# Patient Record
Sex: FEMALE | Race: WHITE | NOT HISPANIC OR LATINO | URBAN - METROPOLITAN AREA
[De-identification: names, ages, dates, MRNs, and addresses within clinical notes are randomized per-mention and may not be internally consistent; named-entity substitution may affect disease eponyms.]

---

## 2020-09-28 ENCOUNTER — OFFICE VISIT (OUTPATIENT)
Dept: URBAN - METROPOLITAN AREA CLINIC 27 | Facility: CLINIC | Age: 60
End: 2020-09-28

## 2020-11-19 ENCOUNTER — OFFICE VISIT (OUTPATIENT)
Dept: URBAN - METROPOLITAN AREA CLINIC 27 | Facility: CLINIC | Age: 60
End: 2020-11-19

## 2021-01-13 ENCOUNTER — OFFICE VISIT (OUTPATIENT)
Dept: URBAN - METROPOLITAN AREA CLINIC 27 | Facility: CLINIC | Age: 61
End: 2021-01-13

## 2021-01-13 PROBLEM — 40739000 DYSPHAGIA: Status: ACTIVE | Noted: 2021-01-13

## 2021-01-13 PROBLEM — 266435005 GASTRO-ESOPHAGEAL REFLUX DISEASE WITHOUT ESOPHAGITIS: Status: ACTIVE | Noted: 2021-01-13

## 2021-01-13 PROBLEM — 363367000: Status: ACTIVE | Noted: 2021-01-13

## 2021-01-13 PROBLEM — 110483000 TOBACCO USER: Status: ACTIVE | Noted: 2021-01-13

## 2021-01-13 PROBLEM — 275978004 SCREENING FOR MALIGNANT NEOPLASM OF COLON: Status: ACTIVE | Noted: 2021-01-13

## 2021-01-22 ENCOUNTER — OFFICE VISIT (OUTPATIENT)
Dept: URBAN - METROPOLITAN AREA SURGERY CENTER 7 | Facility: SURGERY CENTER | Age: 61
End: 2021-01-22

## 2022-04-30 ENCOUNTER — TELEPHONE ENCOUNTER (OUTPATIENT)
Dept: URBAN - METROPOLITAN AREA CLINIC 121 | Facility: CLINIC | Age: 62
End: 2022-04-30

## 2022-04-30 RX ORDER — SUCRALFATE 1 G/10ML
TAKE 10ML BY MOUTH 3 TIMES DAILY SUSPENSION ORAL
OUTPATIENT
Start: 2012-10-25 | End: 2021-01-13

## 2022-04-30 RX ORDER — RABEPRAZOLE SODIUM 20 MG/1
TAKE 1 TABLET BY MOUTH DAILY TABLET, DELAYED RELEASE ORAL
OUTPATIENT
Start: 2012-10-25 | End: 2021-01-13

## 2022-04-30 RX ORDER — RABEPRAZOLE SODIUM 20 MG/1
TAKE 1 TABLET BY MOUTH DAILY TABLET, DELAYED RELEASE ORAL
OUTPATIENT
Start: 2012-10-25

## 2022-04-30 RX ORDER — TAMOXIFEN CITRATE 20 MG/1
TABLET, FILM COATED ORAL
OUTPATIENT
Start: 2006-06-05 | End: 2012-10-18

## 2022-04-30 RX ORDER — ESOMEPRAZOLE MAGNESIUM 40 MG
TAKE 1 CAPSULE BY MOUTH DAILY CAPSULE,DELAYED RELEASE (ENTERIC COATED) ORAL
OUTPATIENT
Start: 2012-10-25

## 2022-04-30 RX ORDER — RABEPRAZOLE SODIUM 20 MG/1
QD TABLET, DELAYED RELEASE ORAL
OUTPATIENT
Start: 2012-10-18 | End: 2021-01-13

## 2022-04-30 RX ORDER — OXYCODONE HYDROCHLORIDE 10 MG/1
TABLET ORAL
OUTPATIENT
Start: 2010-06-16

## 2022-04-30 RX ORDER — ESOMEPRAZOLE MAGNESIUM 40 MG
TAKE 1 CAPSULE BY MOUTH DAILY CAPSULE,DELAYED RELEASE (ENTERIC COATED) ORAL
OUTPATIENT
Start: 2012-10-25 | End: 2021-01-13

## 2022-04-30 RX ORDER — RABEPRAZOLE SODIUM 20 MG/1
QD TABLET, DELAYED RELEASE ORAL
OUTPATIENT
Start: 2012-10-18

## 2022-04-30 RX ORDER — TAMOXIFEN CITRATE 20 MG/1
TABLET, FILM COATED ORAL
OUTPATIENT
Start: 2006-06-05

## 2022-04-30 RX ORDER — SUCRALFATE 1 G/10ML
TAKE 10ML BY MOUTH 3 TIMES DAILY SUSPENSION ORAL
OUTPATIENT
Start: 2012-10-25

## 2022-05-01 ENCOUNTER — TELEPHONE ENCOUNTER (OUTPATIENT)
Dept: URBAN - METROPOLITAN AREA CLINIC 121 | Facility: CLINIC | Age: 62
End: 2022-05-01

## 2022-05-01 RX ORDER — OMEPRAZOLE 40 MG/1
1 CAPSULE PO BID 30 MINS BEFORE MEALS CAPSULE, DELAYED RELEASE ORAL
Status: ACTIVE | COMMUNITY
Start: 2021-01-13

## 2022-05-01 RX ORDER — FAMOTIDINE 20 MG
TABLET ORAL
Status: ACTIVE | COMMUNITY
Start: 2021-01-13

## 2022-05-01 RX ORDER — OXYCODONE HYDROCHLORIDE 10 MG/1
PRN TABLET ORAL
Status: ACTIVE | COMMUNITY
Start: 2012-10-18

## 2022-05-01 RX ORDER — CYCLOBENZAPRINE HYDROCHLORIDE 10 MG/1
TABLET, FILM COATED ORAL
Status: ACTIVE | COMMUNITY
Start: 2021-01-13

## 2022-05-01 RX ORDER — DICYCLOMINE HYDROCHLORIDE 10 MG/1
TAKE 1-2 CAPSULE PO Q6H PRN CAPSULE ORAL
Status: ACTIVE | COMMUNITY
Start: 2021-01-13

## 2025-02-03 ENCOUNTER — LAB OUTSIDE AN ENCOUNTER (OUTPATIENT)
Dept: URBAN - METROPOLITAN AREA CLINIC 27 | Facility: CLINIC | Age: 65
End: 2025-02-03

## 2025-02-03 ENCOUNTER — DASHBOARD ENCOUNTERS (OUTPATIENT)
Age: 65
End: 2025-02-03

## 2025-02-03 ENCOUNTER — TELEPHONE ENCOUNTER (OUTPATIENT)
Dept: URBAN - METROPOLITAN AREA CLINIC 27 | Facility: CLINIC | Age: 65
End: 2025-02-03

## 2025-02-03 ENCOUNTER — OFFICE VISIT (OUTPATIENT)
Dept: URBAN - METROPOLITAN AREA CLINIC 27 | Facility: CLINIC | Age: 65
End: 2025-02-03
Payer: MEDICARE

## 2025-02-03 VITALS
HEART RATE: 65 BPM | BODY MASS INDEX: 20.14 KG/M2 | DIASTOLIC BLOOD PRESSURE: 82 MMHG | HEIGHT: 64 IN | SYSTOLIC BLOOD PRESSURE: 141 MMHG | WEIGHT: 118 LBS

## 2025-02-03 DIAGNOSIS — K21.9 CHRONIC GERD: ICD-10-CM

## 2025-02-03 PROBLEM — 235595009: Status: ACTIVE | Noted: 2025-02-03

## 2025-02-03 PROCEDURE — 99214 OFFICE O/P EST MOD 30 MIN: CPT | Performed by: INTERNAL MEDICINE

## 2025-02-03 RX ORDER — PANTOPRAZOLE SODIUM 40 MG/1
1 TABLET 1/2 TO 1 HOUR BEFORE MORNING MEAL TABLET, DELAYED RELEASE ORAL ONCE A DAY
Status: ACTIVE | COMMUNITY

## 2025-02-03 RX ORDER — FAMOTIDINE 20 MG/1
TABLET, FILM COATED ORAL
Status: ACTIVE | COMMUNITY
Start: 2021-01-13

## 2025-02-03 RX ORDER — OMEPRAZOLE 40 MG/1
1 CAPSULE PO BID 30 MINS BEFORE MEALS CAPSULE, DELAYED RELEASE ORAL
Status: ACTIVE | COMMUNITY
Start: 2021-01-13

## 2025-02-03 RX ORDER — OXYCODONE HYDROCHLORIDE 10 MG/1
PRN TABLET ORAL
Status: ACTIVE | COMMUNITY
Start: 2012-10-18

## 2025-02-03 RX ORDER — DICYCLOMINE HYDROCHLORIDE 10 MG/1
TAKE 1-2 CAPSULE PO Q6H PRN CAPSULE ORAL
Status: ACTIVE | COMMUNITY
Start: 2021-01-13

## 2025-02-03 RX ORDER — CYCLOBENZAPRINE HYDROCHLORIDE 10 MG/1
TABLET, FILM COATED ORAL
Status: ACTIVE | COMMUNITY
Start: 2021-01-13

## 2025-02-03 NOTE — HPI-TODAY'S VISIT:
64-year-old female here for abdominal pain, acid reflux.  She has been having issues with a sore throat for years.  She has been seen by ENT and was told it was likely GERD.  She is currently on pantoprazole twice daily and Pepcid, but continues to have sore throat essentially all the time now.  She has tried omeprazole in the past without much improvement.  She is also on a nasal steroid which has helped with her runny nose, but not her sore throat.

## 2025-02-07 ENCOUNTER — TELEPHONE ENCOUNTER (OUTPATIENT)
Dept: URBAN - METROPOLITAN AREA CLINIC 27 | Facility: CLINIC | Age: 65
End: 2025-02-07

## 2025-03-21 ENCOUNTER — OFFICE VISIT (OUTPATIENT)
Dept: URBAN - METROPOLITAN AREA MEDICAL CENTER 8 | Facility: MEDICAL CENTER | Age: 65
End: 2025-03-21

## 2025-04-16 ENCOUNTER — OFFICE VISIT (OUTPATIENT)
Dept: URBAN - METROPOLITAN AREA CLINIC 27 | Facility: CLINIC | Age: 65
End: 2025-04-16

## 2025-04-16 ENCOUNTER — OUT OF OFFICE VISIT (OUTPATIENT)
Dept: URBAN - METROPOLITAN AREA MEDICAL CENTER 8 | Facility: MEDICAL CENTER | Age: 65
End: 2025-04-16
Payer: MEDICARE

## 2025-04-16 DIAGNOSIS — R12 BURNING REFLUX: ICD-10-CM

## 2025-04-16 DIAGNOSIS — R13.19 CERVICAL DYSPHAGIA: ICD-10-CM

## 2025-04-16 PROCEDURE — 91035 G-ESOPH REFLX TST W/ELECTROD: CPT | Performed by: INTERNAL MEDICINE

## 2025-04-24 ENCOUNTER — OFFICE VISIT (OUTPATIENT)
Dept: URBAN - METROPOLITAN AREA CLINIC 27 | Facility: CLINIC | Age: 65
End: 2025-04-24
Payer: MEDICARE

## 2025-04-24 DIAGNOSIS — J02.9 SORE THROAT: ICD-10-CM

## 2025-04-24 PROCEDURE — 99213 OFFICE O/P EST LOW 20 MIN: CPT | Performed by: INTERNAL MEDICINE

## 2025-04-24 RX ORDER — LANSOPRAZOLE 30 MG/1
1 CAPSULE 1/2 TO 1 HOUR BEFORE MORNING MEAL CAPSULE, DELAYED RELEASE ORAL TWICE A DAY
Qty: 60 | Refills: 1 | OUTPATIENT
Start: 2025-04-24

## 2025-04-24 RX ORDER — OMEPRAZOLE 40 MG/1
1 CAPSULE PO BID 30 MINS BEFORE MEALS CAPSULE, DELAYED RELEASE ORAL
Status: ACTIVE | COMMUNITY
Start: 2021-01-13

## 2025-04-24 RX ORDER — PANTOPRAZOLE SODIUM 40 MG/1
1 TABLET 1/2 TO 1 HOUR BEFORE MORNING MEAL TABLET, DELAYED RELEASE ORAL ONCE A DAY
Status: ACTIVE | COMMUNITY

## 2025-04-24 RX ORDER — OXYCODONE HYDROCHLORIDE 10 MG/1
PRN TABLET ORAL
Status: ACTIVE | COMMUNITY
Start: 2012-10-18

## 2025-04-24 RX ORDER — CYCLOBENZAPRINE HYDROCHLORIDE 10 MG/1
TABLET, FILM COATED ORAL
Status: ACTIVE | COMMUNITY
Start: 2021-01-13

## 2025-04-24 RX ORDER — DICYCLOMINE HYDROCHLORIDE 10 MG/1
TAKE 1-2 CAPSULE PO Q6H PRN CAPSULE ORAL
Status: ACTIVE | COMMUNITY
Start: 2021-01-13

## 2025-04-24 RX ORDER — FAMOTIDINE 20 MG/1
TABLET, FILM COATED ORAL
Status: ACTIVE | COMMUNITY
Start: 2021-01-13

## 2025-04-24 NOTE — HPI-TODAY'S VISIT:
64-year-old female here for follow-up of possible reflux disease.  Predominant symptom is sore throat.  She saw ENT and was told symptoms were likely reflux related.  No improvement with omeprazole or pantoprazole.  She had an EGD that was unremarkable.  pH probe showed no evidence of reflux in the 48-hour period with a DeMeester score of 2 on day 1 and 1.9 on day 2.